# Patient Record
Sex: MALE
[De-identification: names, ages, dates, MRNs, and addresses within clinical notes are randomized per-mention and may not be internally consistent; named-entity substitution may affect disease eponyms.]

---

## 2021-11-11 ENCOUNTER — APPOINTMENT (OUTPATIENT)
Dept: OTOLARYNGOLOGY | Facility: CLINIC | Age: 26
End: 2021-11-11
Payer: COMMERCIAL

## 2021-11-11 DIAGNOSIS — H61.23 IMPACTED CERUMEN, BILATERAL: ICD-10-CM

## 2021-11-11 DIAGNOSIS — H91.93 UNSPECIFIED HEARING LOSS, BILATERAL: ICD-10-CM

## 2021-11-11 DIAGNOSIS — H93.8X3 OTHER SPECIFIED DISORDERS OF EAR, BILATERAL: ICD-10-CM

## 2021-11-11 PROBLEM — Z00.00 ENCOUNTER FOR PREVENTIVE HEALTH EXAMINATION: Status: ACTIVE | Noted: 2021-11-11

## 2021-11-11 PROCEDURE — 99203 OFFICE O/P NEW LOW 30 MIN: CPT | Mod: 25

## 2021-11-11 PROCEDURE — 92557 COMPREHENSIVE HEARING TEST: CPT

## 2021-11-11 PROCEDURE — 92550 TYMPANOMETRY & REFLEX THRESH: CPT

## 2021-11-11 NOTE — PHYSICAL EXAM
[Normal] : no abnormal secretions [de-identified] : bilateral cerumen impaction, removed with curette and suction

## 2021-11-11 NOTE — HISTORY OF PRESENT ILLNESS
[de-identified] : Patient presents today c/o clogged ears . Here for  cerumen removal  .   In March of this year ,  had pain in right ear.   Buring with ear drops . Was seen by Ent on  teleport,.  Ear was impacted and fungal elements .  Has stopped using    qtips.